# Patient Record
Sex: FEMALE | Race: BLACK OR AFRICAN AMERICAN | NOT HISPANIC OR LATINO | Employment: OTHER | URBAN - METROPOLITAN AREA
[De-identification: names, ages, dates, MRNs, and addresses within clinical notes are randomized per-mention and may not be internally consistent; named-entity substitution may affect disease eponyms.]

---

## 2023-03-13 LAB
ANION GAP IN SER/PLAS: 10 MMOL/L (ref 10–20)
CALCIUM (MG/DL) IN SER/PLAS: 9.5 MG/DL (ref 8.6–10.6)
CARBON DIOXIDE, TOTAL (MMOL/L) IN SER/PLAS: 33 MMOL/L (ref 21–32)
CHLORIDE (MMOL/L) IN SER/PLAS: 102 MMOL/L (ref 98–107)
CREATININE (MG/DL) IN SER/PLAS: 0.73 MG/DL (ref 0.5–1.05)
ESTIMATED AVERAGE GLUCOSE FOR HBA1C: 174 MG/DL
GFR FEMALE: 88 ML/MIN/1.73M2
GLUCOSE (MG/DL) IN SER/PLAS: 160 MG/DL (ref 74–99)
HEMOGLOBIN A1C/HEMOGLOBIN TOTAL IN BLOOD: 7.7 %
POTASSIUM (MMOL/L) IN SER/PLAS: 4.6 MMOL/L (ref 3.5–5.3)
SODIUM (MMOL/L) IN SER/PLAS: 140 MMOL/L (ref 136–145)
UREA NITROGEN (MG/DL) IN SER/PLAS: 8 MG/DL (ref 6–23)

## 2023-06-13 LAB
ALANINE AMINOTRANSFERASE (SGPT) (U/L) IN SER/PLAS: 7 U/L (ref 7–45)
ALBUMIN (G/DL) IN SER/PLAS: 3.9 G/DL (ref 3.4–5)
ALKALINE PHOSPHATASE (U/L) IN SER/PLAS: 60 U/L (ref 33–136)
ANION GAP IN SER/PLAS: 11 MMOL/L (ref 10–20)
ASPARTATE AMINOTRANSFERASE (SGOT) (U/L) IN SER/PLAS: 11 U/L (ref 9–39)
BASOPHILS (10*3/UL) IN BLOOD BY AUTOMATED COUNT: 0.05 X10E9/L (ref 0–0.1)
BASOPHILS/100 LEUKOCYTES IN BLOOD BY AUTOMATED COUNT: 0.9 % (ref 0–2)
BILIRUBIN DIRECT (MG/DL) IN SER/PLAS: 0.1 MG/DL (ref 0–0.3)
BILIRUBIN TOTAL (MG/DL) IN SER/PLAS: 0.5 MG/DL (ref 0–1.2)
CALCIUM (MG/DL) IN SER/PLAS: 9.6 MG/DL (ref 8.6–10.6)
CARBON DIOXIDE, TOTAL (MMOL/L) IN SER/PLAS: 31 MMOL/L (ref 21–32)
CHLORIDE (MMOL/L) IN SER/PLAS: 103 MMOL/L (ref 98–107)
CHOLESTEROL (MG/DL) IN SER/PLAS: 258 MG/DL (ref 0–199)
CHOLESTEROL IN HDL (MG/DL) IN SER/PLAS: 43.8 MG/DL
CHOLESTEROL/HDL RATIO: 5.9
CREATININE (MG/DL) IN SER/PLAS: 0.8 MG/DL (ref 0.5–1.05)
EOSINOPHILS (10*3/UL) IN BLOOD BY AUTOMATED COUNT: 0.09 X10E9/L (ref 0–0.7)
EOSINOPHILS/100 LEUKOCYTES IN BLOOD BY AUTOMATED COUNT: 1.6 % (ref 0–6)
ERYTHROCYTE DISTRIBUTION WIDTH (RATIO) BY AUTOMATED COUNT: 12.6 % (ref 11.5–14.5)
ERYTHROCYTE MEAN CORPUSCULAR HEMOGLOBIN CONCENTRATION (G/DL) BY AUTOMATED: 32.3 G/DL (ref 32–36)
ERYTHROCYTE MEAN CORPUSCULAR VOLUME (FL) BY AUTOMATED COUNT: 105 FL (ref 80–100)
ERYTHROCYTES (10*6/UL) IN BLOOD BY AUTOMATED COUNT: 4.46 X10E12/L (ref 4–5.2)
ESTIMATED AVERAGE GLUCOSE FOR HBA1C: 169 MG/DL
GFR FEMALE: 79 ML/MIN/1.73M2
GLUCOSE (MG/DL) IN SER/PLAS: 144 MG/DL (ref 74–99)
HEMATOCRIT (%) IN BLOOD BY AUTOMATED COUNT: 46.7 % (ref 36–46)
HEMOGLOBIN (G/DL) IN BLOOD: 15.1 G/DL (ref 12–16)
HEMOGLOBIN A1C/HEMOGLOBIN TOTAL IN BLOOD: 7.5 %
IMMATURE GRANULOCYTES/100 LEUKOCYTES IN BLOOD BY AUTOMATED COUNT: 0.5 % (ref 0–0.9)
LDL: 180 MG/DL (ref 0–99)
LEUKOCYTES (10*3/UL) IN BLOOD BY AUTOMATED COUNT: 5.6 X10E9/L (ref 4.4–11.3)
LYMPHOCYTES (10*3/UL) IN BLOOD BY AUTOMATED COUNT: 3.51 X10E9/L (ref 1.2–4.8)
LYMPHOCYTES/100 LEUKOCYTES IN BLOOD BY AUTOMATED COUNT: 63.1 % (ref 13–44)
MONOCYTES (10*3/UL) IN BLOOD BY AUTOMATED COUNT: 0.45 X10E9/L (ref 0.1–1)
MONOCYTES/100 LEUKOCYTES IN BLOOD BY AUTOMATED COUNT: 8.1 % (ref 2–10)
NEUTROPHILS (10*3/UL) IN BLOOD BY AUTOMATED COUNT: 1.43 X10E9/L (ref 1.2–7.7)
NEUTROPHILS/100 LEUKOCYTES IN BLOOD BY AUTOMATED COUNT: 25.8 % (ref 40–80)
NRBC (PER 100 WBCS) BY AUTOMATED COUNT: 0 /100 WBC (ref 0–0)
PLATELETS (10*3/UL) IN BLOOD AUTOMATED COUNT: 250 X10E9/L (ref 150–450)
POTASSIUM (MMOL/L) IN SER/PLAS: 4 MMOL/L (ref 3.5–5.3)
PROTEIN TOTAL: 7.2 G/DL (ref 6.4–8.2)
SODIUM (MMOL/L) IN SER/PLAS: 141 MMOL/L (ref 136–145)
TRIGLYCERIDE (MG/DL) IN SER/PLAS: 171 MG/DL (ref 0–149)
UREA NITROGEN (MG/DL) IN SER/PLAS: 12 MG/DL (ref 6–23)
VLDL: 34 MG/DL (ref 0–40)

## 2023-09-18 LAB
ANION GAP IN SER/PLAS: 10 MMOL/L (ref 10–20)
CALCIUM (MG/DL) IN SER/PLAS: 9.4 MG/DL (ref 8.6–10.6)
CARBON DIOXIDE, TOTAL (MMOL/L) IN SER/PLAS: 31 MMOL/L (ref 21–32)
CHLORIDE (MMOL/L) IN SER/PLAS: 104 MMOL/L (ref 98–107)
CREATININE (MG/DL) IN SER/PLAS: 0.83 MG/DL (ref 0.5–1.05)
ESTIMATED AVERAGE GLUCOSE FOR HBA1C: 157 MG/DL
GFR FEMALE: 75 ML/MIN/1.73M2
GLUCOSE (MG/DL) IN SER/PLAS: 127 MG/DL (ref 74–99)
HEMOGLOBIN A1C/HEMOGLOBIN TOTAL IN BLOOD: 7.1 %
POTASSIUM (MMOL/L) IN SER/PLAS: 4.4 MMOL/L (ref 3.5–5.3)
SODIUM (MMOL/L) IN SER/PLAS: 141 MMOL/L (ref 136–145)
UREA NITROGEN (MG/DL) IN SER/PLAS: 10 MG/DL (ref 6–23)

## 2023-10-27 PROBLEM — R09.89 BRUIT: Status: ACTIVE | Noted: 2023-10-27

## 2023-10-27 PROBLEM — E11.9 DIABETES MELLITUS (MULTI): Status: ACTIVE | Noted: 2023-10-27

## 2023-10-27 PROBLEM — R01.1 CARDIAC MURMUR: Status: ACTIVE | Noted: 2023-10-27

## 2023-10-27 PROBLEM — E78.5 HYPERLIPIDEMIA: Status: ACTIVE | Noted: 2023-10-27

## 2023-10-27 PROBLEM — S92.514A CLOSED NONDISPLACED FRACTURE OF PROXIMAL PHALANX OF LESSER TOE OF RIGHT FOOT: Status: ACTIVE | Noted: 2023-10-27

## 2023-10-27 PROBLEM — I73.9 PERIPHERAL ARTERIAL DISEASE (CMS-HCC): Status: ACTIVE | Noted: 2023-10-27

## 2023-10-27 PROBLEM — R94.39 ABNORMAL STRESS TEST: Status: ACTIVE | Noted: 2023-10-27

## 2023-10-27 PROBLEM — R92.8 ABNORMAL FINDING ON BREAST IMAGING: Status: ACTIVE | Noted: 2023-10-27

## 2023-10-27 PROBLEM — I70.261 ATHEROSCLEROSIS OF NATIVE ARTERY OF RIGHT LOWER EXTREMITY WITH GANGRENE (MULTI): Status: ACTIVE | Noted: 2023-10-27

## 2023-10-27 PROBLEM — T81.89XA SURGICAL WOUND, NON HEALING: Status: ACTIVE | Noted: 2023-10-27

## 2023-10-27 PROBLEM — I65.23 ASYMPTOMATIC BILATERAL CAROTID ARTERY STENOSIS: Status: ACTIVE | Noted: 2023-10-27

## 2023-10-27 RX ORDER — CLOPIDOGREL BISULFATE 75 MG/1
1 TABLET ORAL DAILY
COMMUNITY
Start: 2020-04-13

## 2023-10-27 RX ORDER — POTASSIUM CHLORIDE 750 MG/1
1 TABLET, FILM COATED, EXTENDED RELEASE ORAL DAILY
COMMUNITY
Start: 2019-08-05

## 2023-10-27 RX ORDER — METOPROLOL TARTRATE 25 MG/1
1 TABLET, FILM COATED ORAL DAILY
COMMUNITY
End: 2023-10-30 | Stop reason: CLARIF

## 2023-10-27 RX ORDER — ASPIRIN 81 MG/1
1 TABLET ORAL DAILY
COMMUNITY

## 2023-10-27 RX ORDER — ACETAMINOPHEN 500 MG
TABLET ORAL AS NEEDED
COMMUNITY

## 2023-10-27 RX ORDER — METFORMIN HYDROCHLORIDE 1000 MG/1
1 TABLET ORAL EVERY 12 HOURS
COMMUNITY

## 2023-10-27 RX ORDER — ATORVASTATIN CALCIUM 80 MG/1
1 TABLET, FILM COATED ORAL DAILY
COMMUNITY

## 2023-10-27 RX ORDER — LISINOPRIL 10 MG/1
1 TABLET ORAL DAILY
COMMUNITY
Start: 2019-08-05 | End: 2023-10-30 | Stop reason: DRUGHIGH

## 2023-10-27 RX ORDER — GABAPENTIN 300 MG/1
300 CAPSULE ORAL 3 TIMES DAILY
COMMUNITY

## 2023-10-30 ENCOUNTER — HOSPITAL ENCOUNTER (OUTPATIENT)
Dept: VASCULAR MEDICINE | Facility: HOSPITAL | Age: 73
Discharge: HOME | End: 2023-10-30
Payer: MEDICARE

## 2023-10-30 ENCOUNTER — OFFICE VISIT (OUTPATIENT)
Dept: VASCULAR SURGERY | Facility: HOSPITAL | Age: 73
End: 2023-10-30
Payer: MEDICARE

## 2023-10-30 VITALS
HEIGHT: 61 IN | OXYGEN SATURATION: 93 % | HEART RATE: 86 BPM | WEIGHT: 151 LBS | RESPIRATION RATE: 17 BRPM | BODY MASS INDEX: 28.51 KG/M2 | DIASTOLIC BLOOD PRESSURE: 67 MMHG | SYSTOLIC BLOOD PRESSURE: 114 MMHG

## 2023-10-30 DIAGNOSIS — I73.9 PERIPHERAL VASCULAR DISEASE, UNSPECIFIED (CMS-HCC): ICD-10-CM

## 2023-10-30 DIAGNOSIS — I73.9 PAD (PERIPHERAL ARTERY DISEASE) (CMS-HCC): Primary | ICD-10-CM

## 2023-10-30 DIAGNOSIS — I65.23 BILATERAL CAROTID ARTERY STENOSIS WITHOUT CEREBRAL INFARCTION: ICD-10-CM

## 2023-10-30 PROCEDURE — 3078F DIAST BP <80 MM HG: CPT | Performed by: CLINICAL NURSE SPECIALIST

## 2023-10-30 PROCEDURE — 1159F MED LIST DOCD IN RCRD: CPT | Performed by: CLINICAL NURSE SPECIALIST

## 2023-10-30 PROCEDURE — 93922 UPR/L XTREMITY ART 2 LEVELS: CPT | Performed by: STUDENT IN AN ORGANIZED HEALTH CARE EDUCATION/TRAINING PROGRAM

## 2023-10-30 PROCEDURE — 93922 UPR/L XTREMITY ART 2 LEVELS: CPT

## 2023-10-30 PROCEDURE — 99214 OFFICE O/P EST MOD 30 MIN: CPT | Performed by: CLINICAL NURSE SPECIALIST

## 2023-10-30 PROCEDURE — 1126F AMNT PAIN NOTED NONE PRSNT: CPT | Performed by: CLINICAL NURSE SPECIALIST

## 2023-10-30 PROCEDURE — 3051F HG A1C>EQUAL 7.0%<8.0%: CPT | Performed by: CLINICAL NURSE SPECIALIST

## 2023-10-30 PROCEDURE — 4004F PT TOBACCO SCREEN RCVD TLK: CPT | Performed by: CLINICAL NURSE SPECIALIST

## 2023-10-30 PROCEDURE — 4010F ACE/ARB THERAPY RXD/TAKEN: CPT | Performed by: CLINICAL NURSE SPECIALIST

## 2023-10-30 PROCEDURE — 3074F SYST BP LT 130 MM HG: CPT | Performed by: CLINICAL NURSE SPECIALIST

## 2023-10-30 RX ORDER — METOPROLOL SUCCINATE 50 MG/1
50 TABLET, EXTENDED RELEASE ORAL DAILY
COMMUNITY
Start: 2023-06-01

## 2023-10-30 RX ORDER — EZETIMIBE 10 MG/1
10 TABLET ORAL DAILY
COMMUNITY
Start: 2023-06-15

## 2023-10-30 RX ORDER — FUROSEMIDE 20 MG/1
20 TABLET ORAL DAILY PRN
COMMUNITY
Start: 2022-12-16

## 2023-10-30 RX ORDER — GLIMEPIRIDE 1 MG/1
1 TABLET ORAL
COMMUNITY
Start: 2023-06-15

## 2023-10-30 RX ORDER — LISINOPRIL 20 MG/1
20 TABLET ORAL DAILY
COMMUNITY
Start: 2023-06-15

## 2023-10-30 ASSESSMENT — ENCOUNTER SYMPTOMS
DEPRESSION: 0
OCCASIONAL FEELINGS OF UNSTEADINESS: 0
LOSS OF SENSATION IN FEET: 0

## 2023-10-30 ASSESSMENT — VISUAL ACUITY: OU: 1

## 2023-10-30 NOTE — PROGRESS NOTES
Vascular Surgery Consultation, History, Physical    Kalpana Young is a 73 y.o. year old female patient here for follow-up status post right leg revascularization for gangrenous right second toe .  Patient has been doing very well.  Patient denies any chest pain, shortness of breath, fever or chills.  Patient denies any leg pain with walking or at rest.  Patient denies any recent wounds that are nonhealing.  Patient has no left leg complaints as well.  History:   2020  OPERATION/PROCEDURE:   1. Right common and deep femoral endarterectomy with vein patch   angioplasty.  2. Right superficial femoral artery endarterectomy with vein   patch angioplasty.  3. Angioplasty and stent placement in right femoral popliteal   artery using drug-eluting stent and drug-coated balloon.  4. Central line placement for emergency access during the   procedure.     Procedure for rest pain and gangrene   Has known R SFA restenosis, asymptomatic   No rest pain No claudication   Denies amurosis fugax, TIA or stroke   No past medical history on file.    Past Surgical History:   Procedure Laterality Date    OTHER SURGICAL HISTORY  07/10/2019    Cholecystectomy    OTHER SURGICAL HISTORY  07/10/2019     section    OTHER SURGICAL HISTORY  07/10/2019    Tonsillectomy       Active Ambulatory Problems     Diagnosis Date Noted    Abnormal finding on breast imaging 10/27/2023    Abnormal stress test 10/27/2023    Asymptomatic bilateral carotid artery stenosis 10/27/2023    Atherosclerosis of native artery of right lower extremity with gangrene (CMS/[facility]) 10/27/2023    Bruit 10/27/2023    Cardiac murmur 10/27/2023    Closed nondisplaced fracture of proximal phalanx of lesser toe of right foot 10/27/2023    Diabetes mellitus (CMS/HCC) 10/27/2023    Hyperlipidemia 10/27/2023    Peripheral arterial disease (CMS/HCC) 10/27/2023    Surgical wound, non healing 10/27/2023     Resolved Ambulatory Problems     Diagnosis Date Noted    No Resolved  Ambulatory Problems     No Additional Past Medical History       Review of Systems   All other systems reviewed and are negative.      Allergies   Allergen Reactions    Codeine Rash    Penicillins Rash       Vitals:   Visit Vitals  /67 (BP Location: Left arm, Patient Position: Sitting, BP Cuff Size: Large adult)   Pulse 86   Resp 17     Physical Exam:   Vascular Physical Exam  Vitals and nursing note reviewed.   Constitutional:       General: She is awake.   Eyes:      General: Vision grossly intact.   Cardiovascular:      Rate and Rhythm: Normal rate and regular rhythm.      Pulses:           Carotid pulses are 2+ on the right side and 2+ on the left side.       Radial pulses are 2+ on the right side and 2+ on the left side.        Femoral pulses are 2+ on the right side and 2+ on the left side.       Popliteal pulses are 2+ on the right side and 2+ on the left side.        Dorsalis pedis pulses are detected w/ Doppler on the right side and 2+ on the left side.  Right dorsalis pedis pulse signal is biphasic.         Posterior tibial pulses are detected w/ Doppler on the right side and 2+ on the left side. Right posterior tibial pulse signal is monophasic.   Abdominal:      General: Abdomen is flat. Bowel sounds are normal.   Musculoskeletal:      Neck: Full passive range of motion without pain.   Skin:     General: Skin is warm and dry.   Neurological:      Mental Status: She is alert.         Labs:  LABS:  CBC with Differential:    Lab Results   Component Value Date    WBC 5.6 06/13/2023    RBC 4.46 06/13/2023    HGB 15.1 06/13/2023    HCT 46.7 (H) 06/13/2023     06/13/2023     (H) 06/13/2023    MCHC 32.3 06/13/2023    RDW 12.6 06/13/2023    NRBC 0.0 06/13/2023    LYMPHOPCT 63.1 06/13/2023    MONOPCT 8.1 06/13/2023    EOSPCT 1.6 06/13/2023    BASOPCT 0.9 06/13/2023    MONOSABS 0.45 06/13/2023    LYMPHSABS 3.51 06/13/2023    EOSABS 0.09 06/13/2023    BASOSABS 0.05 06/13/2023     CMP:    Lab  GENERAL:  Alert, oriented, pleasant, in no apparent distress. HEENT:  Conjunctiva pink, no scleral icterus. ENT clear. NECK:  Supple. No jugular venous distention noted. No masses felt,  CARDIOVASCULAR:  Normal S1 and S2    PULMONARY:  No respiratory distress. No wheezes or rales. ABDOMEN:  Soft and non-tender,no masses  or organomegaly. EXTREMITIES:  No cyanosis, clubbing, or significant edema. SKIN: Skin is warm and dry. NEUROLOGICAL:  Cranial nerves II through XII are grossly intact. IMPRESSION:    Encounter Diagnoses   Name Primary?  Cough Yes    Type 2 diabetes mellitus without complication, without long-term current use of insulin ([facility])     Essential hypertension     Mixed hyperlipidemia     Chronic midline low back pain without sciatica     Restless leg syndrome     Cyst, kidney, acquired     Class 2 obesity due to excess calories without serious comorbidity with body mass index (BMI) of 39.0 to 39.9 in adult     Dupuytren's contracture of right hand     CAD s/p MICLIF in 1998        ASSESSMENT/Plan  1. Cough  Patient has cough for the last 2 months. Seems to be due to ACE inhibitor this and apparently  We will discontinue lisinopril  Get a chest x-ray   Start patient on Diovan 160 mg daily  Side effects explained to the patient  - XR Chest PA and Lateral; Future  - CBC with Differential; Future    2. Type 2 diabetes mellitus without complication, without long-term current use of insulin ([facility]  Patient has diabetes mellitus. Hemoglobin A1c has decreased to 5.9. She had no hypoglycemic episodes. Continue glipizide 2.5 mg metformin 500 mg 2 tablets twice a day follow diet and exercise  - POCT glycosylated hemoglobin (Hb A1C)    3. Essential hypertension  Patient has hypertension  Patient was taking lisinopril that will be discontinued because of cough. Start patient on Diovan 160 mg daily.   Continue metoprolol 25 mg twice a day    - Comprehensive Metabolic Panel; "Results   Component Value Date     09/18/2023    K 4.4 09/18/2023     09/18/2023    CO2 31 09/18/2023    BUN 10 09/18/2023    CREATININE 0.83 09/18/2023    GLUCOSE 127 (H) 09/18/2023    PROT 7.2 06/13/2023    CALCIUM 9.4 09/18/2023    BILITOT 0.5 06/13/2023    ALKPHOS 60 06/13/2023    AST 11 06/13/2023    ALT 7 06/13/2023     BMP:    Lab Results   Component Value Date     09/18/2023    K 4.4 09/18/2023     09/18/2023    CO2 31 09/18/2023    BUN 10 09/18/2023    CREATININE 0.83 09/18/2023    CALCIUM 9.4 09/18/2023    GLUCOSE 127 (H) 09/18/2023     Magnesium:No results found for: \"MG\"  Troponin:  No results found for: \"TROPHS\"  BNP: No results found for: \"BNP\"    Lab Results   Component Value Date    INR 1.2 (H) 03/31/2020    PROTIME 13.4 (H) 03/31/2020    CHOL 258 (H) 06/13/2023    LDLF 180 (H) 06/13/2023    HDL 43.8 06/13/2023       Imaging: PVR reviewed     Impression: stable    Plan:   It was a pleasure to see you today.  Status post right leg revascularization in 2021.  No signs and symptoms of claudication or rest pain.  Pulses present with Doppler on the right leg.  Follow-up in 1 year with repeat carotid duplex and PVR  Follow-up sooner if needed  Please call [phone] for any questions.  " Future    4. Mixed hyperlipidemia  Patient has hyperlipidemia. Follow low-cholesterol diet and continue Crestor 10 mg daily. Will check lipid profile  - Lipid Panel w/ Reflex Direct LDL; Future    5. Chronic midline low back pain without sciatica  Patient had chronic lower back pain that is stable. 6. Restless leg syndrome  Patient has restless leg syndrome that also stable. Patient was taking coenzyme Q10. And vitamin D3     7. Cyst, kidney, acquired  Patient has cyst in the kidney and was seeing urologist Dr. Portia Flynn in the past but she did not keep the follow-up appointments. 8. Class 2 obesity due to excess calories without serious comorbidity with body mass index (BMI) of 39.0 to 39.9 in adult  Patient to lose weight and do exercise     9. Dupuytren's contracture of right hand  Patient has a Dupuytren's contracture of the right hand stable       10. CAD s/p MI, TPA in 1998  Patient's coronary artery disease no angina. No chest pain  Patient is on aspirin, beta blocker and statins    Return to office in 3 months. If the cough does not improve patient should return in 1 month     Mediations reviewed with the patient. Continue current medications. Appropriate prescriptions are addressed. After visit summery provided. Follow up as directed sooner if needed. Questions answered and patient verbalizes understanding. Call for any problems, questions, or concerns. No Known Allergies  Current Outpatient Prescriptions   Medication Sig Dispense Refill    rosuvastatin (CRESTOR) 10 MG tablet Take 1 tablet by mouth nightly 90 tablet 1    econazole nitrate 1 % cream Apply 1 each topically 2 times daily Apply topically daily.  60 g 0    metoprolol tartrate (LOPRESSOR) 50 MG tablet Take 0.5 tablets by mouth 2 times daily 90 tablet 1    lisinopril (PRINIVIL;ZESTRIL) 20 MG tablet Take 1 tablet by mouth daily 90 tablet 1    glipiZIDE-metformin (METAGLIP) 2.5-500 MG per tablet Take 2 tablets by mouth 2 times daily (before meals) 360 tablet 3    aspirin 81 MG EC tablet Take 1 tablet by mouth nightly. 30 tablet     Cholecalciferol (VITAMIN D PO) Take 5,000 Units by mouth 2 times daily       Coenzyme Q10 (CO Q 10 PO) Take 200 mg by mouth daily.  Misc. Devices (RAISED TOILET SEAT) MISC Lifetime need. Low back pain R hip pain 1 each 0    naproxen (NAPROSYN) 500 MG tablet Take 1 tablet by mouth 2 times daily as needed for Pain 20 tablet 0    HYDROcodone-acetaminophen (NORCO) 5-325 MG per tablet Take 1-2 tablets by mouth every 4 hours as needed for Pain . 20 tablet 0     No current facility-administered medications for this visit. Past Medical History:   Diagnosis Date    Arthritis     left knee pain, sees Dr. Della Aleman CAD (coronary artery disease)     sees Dr. Andrea Edwards Chronic midline low back pain without sciatica 9/28/2016    Had PT in 2016   [address] Colonoscopy refused     discussed in 7/15    DM (diabetes mellitus), type 2 ([facility] 02/2006    Dupuytren's contracture of right hand     Endometrial stripe increased 9/25/2014    Saw NP Maynor Zaragoza. Was normal exam per pt.  Glaucoma 4/1/2014    H/O echocardiogram 10/02/2014    Mildly depressed left ventricular function; ejection fraction of 45%. Moderate pulmonary hypertension.      HTN (hypertension)     Hyperlipidemia     Kidney stone     hx-\"been about 3 yrs ago\"    MI (myocardial infarction) 02/1998    treated with TPA    Obesity     Vertigo     'have been having this since I had cataract surgery\"\"that is why they are doing the MRA of my brain(10/31/2014)     Past Surgical History:   Procedure Laterality Date    CATARACT REMOVAL Bilateral     5/14,8/14    INGUINAL HERNIA REPAIR Left 45 yrs ago     Social History   Substance Use Topics    Smoking status: Never Smoker    Smokeless tobacco: Never Used    Alcohol use No       LAB REVIEW:  CBC:   Lab Results   Component Value Date    WBC 7.5 08/18/2017    HGB 12.8 08/18/2017

## 2023-10-30 NOTE — PATIENT INSTRUCTIONS
It was a pleasure to see you today.  Status post right leg revascularization in 2021.  No signs and symptoms of claudication or rest pain.  Pulses present with Doppler on the right leg.  Follow-up in 1 year with repeat carotid duplex and PVR  Follow-up sooner if needed  Please call [phone] for any questions.

## 2023-12-11 ENCOUNTER — LAB (OUTPATIENT)
Dept: LAB | Facility: LAB | Age: 73
End: 2023-12-11
Payer: MEDICARE

## 2023-12-11 DIAGNOSIS — E11.9 TYPE 2 DIABETES MELLITUS WITHOUT COMPLICATIONS (MULTI): Primary | ICD-10-CM

## 2023-12-11 LAB
ANION GAP SERPL CALC-SCNC: 13 MMOL/L (ref 10–20)
BASOPHILS # BLD AUTO: 0.03 X10*3/UL (ref 0–0.1)
BASOPHILS NFR BLD AUTO: 0.7 %
BUN SERPL-MCNC: 8 MG/DL (ref 6–23)
CALCIUM SERPL-MCNC: 9.3 MG/DL (ref 8.6–10.6)
CHLORIDE SERPL-SCNC: 105 MMOL/L (ref 98–107)
CO2 SERPL-SCNC: 30 MMOL/L (ref 21–32)
CREAT SERPL-MCNC: 0.84 MG/DL (ref 0.5–1.05)
EOSINOPHIL # BLD AUTO: 0.05 X10*3/UL (ref 0–0.4)
EOSINOPHIL NFR BLD AUTO: 1.1 %
ERYTHROCYTE [DISTWIDTH] IN BLOOD BY AUTOMATED COUNT: 12.8 % (ref 11.5–14.5)
EST. AVERAGE GLUCOSE BLD GHB EST-MCNC: 157 MG/DL
GFR SERPL CREATININE-BSD FRML MDRD: 74 ML/MIN/1.73M*2
GLUCOSE SERPL-MCNC: 163 MG/DL (ref 74–99)
HBA1C MFR BLD: 7.1 %
HCT VFR BLD AUTO: 44.9 % (ref 36–46)
HGB BLD-MCNC: 14.6 G/DL (ref 12–16)
IMM GRANULOCYTES # BLD AUTO: 0.02 X10*3/UL (ref 0–0.5)
IMM GRANULOCYTES NFR BLD AUTO: 0.4 % (ref 0–0.9)
LYMPHOCYTES # BLD AUTO: 2.32 X10*3/UL (ref 0.8–3)
LYMPHOCYTES NFR BLD AUTO: 51.4 %
MCH RBC QN AUTO: 34 PG (ref 26–34)
MCHC RBC AUTO-ENTMCNC: 32.5 G/DL (ref 32–36)
MCV RBC AUTO: 104 FL (ref 80–100)
MONOCYTES # BLD AUTO: 0.31 X10*3/UL (ref 0.05–0.8)
MONOCYTES NFR BLD AUTO: 6.9 %
NEUTROPHILS # BLD AUTO: 1.78 X10*3/UL (ref 1.6–5.5)
NEUTROPHILS NFR BLD AUTO: 39.5 %
NRBC BLD-RTO: 0 /100 WBCS (ref 0–0)
PLATELET # BLD AUTO: 331 X10*3/UL (ref 150–450)
POTASSIUM SERPL-SCNC: 4.8 MMOL/L (ref 3.5–5.3)
RBC # BLD AUTO: 4.3 X10*6/UL (ref 4–5.2)
SODIUM SERPL-SCNC: 143 MMOL/L (ref 136–145)
WBC # BLD AUTO: 4.5 X10*3/UL (ref 4.4–11.3)

## 2023-12-11 PROCEDURE — 80048 BASIC METABOLIC PNL TOTAL CA: CPT

## 2023-12-11 PROCEDURE — 83036 HEMOGLOBIN GLYCOSYLATED A1C: CPT

## 2023-12-11 PROCEDURE — 36415 COLL VENOUS BLD VENIPUNCTURE: CPT

## 2023-12-11 PROCEDURE — 85025 COMPLETE CBC W/AUTO DIFF WBC: CPT

## 2024-02-26 ENCOUNTER — LAB (OUTPATIENT)
Dept: LAB | Facility: LAB | Age: 74
End: 2024-02-26
Payer: MEDICARE

## 2024-02-26 DIAGNOSIS — E11.9 TYPE 2 DIABETES MELLITUS WITHOUT COMPLICATIONS (MULTI): ICD-10-CM

## 2024-02-26 DIAGNOSIS — I10 ESSENTIAL (PRIMARY) HYPERTENSION: Primary | ICD-10-CM

## 2024-02-26 LAB
ALBUMIN SERPL BCP-MCNC: 3.9 G/DL (ref 3.4–5)
ALP SERPL-CCNC: 78 U/L (ref 33–136)
ALT SERPL W P-5'-P-CCNC: 9 U/L (ref 7–45)
ANION GAP SERPL CALC-SCNC: 12 MMOL/L (ref 10–20)
AST SERPL W P-5'-P-CCNC: 10 U/L (ref 9–39)
BASOPHILS # BLD AUTO: 0.05 X10*3/UL (ref 0–0.1)
BASOPHILS NFR BLD AUTO: 0.8 %
BILIRUB DIRECT SERPL-MCNC: 0.1 MG/DL (ref 0–0.3)
BILIRUB SERPL-MCNC: 0.4 MG/DL (ref 0–1.2)
BUN SERPL-MCNC: 11 MG/DL (ref 6–23)
CALCIUM SERPL-MCNC: 9.6 MG/DL (ref 8.6–10.6)
CHLORIDE SERPL-SCNC: 104 MMOL/L (ref 98–107)
CHOLEST SERPL-MCNC: 246 MG/DL (ref 0–199)
CHOLESTEROL/HDL RATIO: 5.7
CO2 SERPL-SCNC: 29 MMOL/L (ref 21–32)
CREAT SERPL-MCNC: 0.84 MG/DL (ref 0.5–1.05)
EGFRCR SERPLBLD CKD-EPI 2021: 74 ML/MIN/1.73M*2
EOSINOPHIL # BLD AUTO: 0.07 X10*3/UL (ref 0–0.4)
EOSINOPHIL NFR BLD AUTO: 1.2 %
ERYTHROCYTE [DISTWIDTH] IN BLOOD BY AUTOMATED COUNT: 12.9 % (ref 11.5–14.5)
EST. AVERAGE GLUCOSE BLD GHB EST-MCNC: 163 MG/DL
GLUCOSE SERPL-MCNC: 195 MG/DL (ref 74–99)
HBA1C MFR BLD: 7.3 %
HCT VFR BLD AUTO: 46.1 % (ref 36–46)
HDLC SERPL-MCNC: 43.1 MG/DL
HGB BLD-MCNC: 14.9 G/DL (ref 12–16)
IMM GRANULOCYTES # BLD AUTO: 0.03 X10*3/UL (ref 0–0.5)
IMM GRANULOCYTES NFR BLD AUTO: 0.5 % (ref 0–0.9)
LDLC SERPL CALC-MCNC: 142 MG/DL
LYMPHOCYTES # BLD AUTO: 3.19 X10*3/UL (ref 0.8–3)
LYMPHOCYTES NFR BLD AUTO: 54.2 %
MCH RBC QN AUTO: 34.2 PG (ref 26–34)
MCHC RBC AUTO-ENTMCNC: 32.3 G/DL (ref 32–36)
MCV RBC AUTO: 106 FL (ref 80–100)
MONOCYTES # BLD AUTO: 0.34 X10*3/UL (ref 0.05–0.8)
MONOCYTES NFR BLD AUTO: 5.8 %
NEUTROPHILS # BLD AUTO: 2.21 X10*3/UL (ref 1.6–5.5)
NEUTROPHILS NFR BLD AUTO: 37.5 %
NON HDL CHOLESTEROL: 203 MG/DL (ref 0–149)
NRBC BLD-RTO: 0 /100 WBCS (ref 0–0)
PLATELET # BLD AUTO: 296 X10*3/UL (ref 150–450)
POTASSIUM SERPL-SCNC: 4.3 MMOL/L (ref 3.5–5.3)
PROT SERPL-MCNC: 6.9 G/DL (ref 6.4–8.2)
RBC # BLD AUTO: 4.36 X10*6/UL (ref 4–5.2)
SODIUM SERPL-SCNC: 141 MMOL/L (ref 136–145)
TRIGL SERPL-MCNC: 307 MG/DL (ref 0–149)
TSH SERPL-ACNC: 1.04 MIU/L (ref 0.44–3.98)
VLDL: 61 MG/DL (ref 0–40)
WBC # BLD AUTO: 5.9 X10*3/UL (ref 4.4–11.3)

## 2024-02-26 PROCEDURE — 80061 LIPID PANEL: CPT

## 2024-02-26 PROCEDURE — 36415 COLL VENOUS BLD VENIPUNCTURE: CPT

## 2024-02-26 PROCEDURE — 84443 ASSAY THYROID STIM HORMONE: CPT

## 2024-02-26 PROCEDURE — 83036 HEMOGLOBIN GLYCOSYLATED A1C: CPT

## 2024-02-26 PROCEDURE — 82248 BILIRUBIN DIRECT: CPT

## 2024-02-26 PROCEDURE — 80053 COMPREHEN METABOLIC PANEL: CPT

## 2024-02-26 PROCEDURE — 85025 COMPLETE CBC W/AUTO DIFF WBC: CPT

## 2024-04-16 NOTE — PROGRESS NOTES
[facility]  Kalpana Young female   1950 73 y.o.   [id number]      Chief Complaint  Follow up mammogram.     History Of Present Illness  Kalpana Young is a pleasant 73 y.o. AA female for follow up mammogram. She denies breast surgery or biopsy. She has family history of breast cancer, see below.    BREAST IMAGIN/10/2023 Bilateral screening mammogram, indicates BI-RADS Category 1. 2022 Left diagnostic mammogram with ultrasound, indicates BI-RADS Category 2.     REPRODUCTIVE HISTORY: menarche age 12, , first birth age 31, did not breastfeed, no OCP's, natural menopause age 50, no HRT, scattered fibroglandular tissue    FAMILY CANCER HISTORY:   Maternal Aunt: Breast cancer, age 69  Maternal Cousin: Breast cancer, age 48  Paternal Uncle: Lung cancer, late 60's     Review of Systems  Constitutional:  Negative for appetite change, fatigue, fever and unexpected weight change.   HENT:  Negative for ear pain, hearing loss, nosebleeds, sore throat and trouble swallowing.    Eyes:  Negative for discharge, itching and visual disturbance.   Breast: As stated in HPI.  Respiratory:  Negative for cough, chest tightness and shortness of breath.    Cardiovascular:  Negative for chest pain, palpitations and leg swelling.   Gastrointestinal:  Negative for abdominal pain, constipation, diarrhea and nausea.   Endocrine: Negative for cold intolerance and heat intolerance.   Genitourinary:  Negative for dysuria, frequency, hematuria, pelvic pain and vaginal bleeding.   Musculoskeletal:  Negative for arthralgias, back pain, gait problem, joint swelling and myalgias.   Skin:  Negative for color change and rash.   Allergic/Immunologic: Negative for environmental allergies and food allergies.   Neurological:  Negative for dizziness, tremors, speech difficulty, weakness, numbness and headaches.   Hematological:  Does not bruise/bleed easily.   Psychiatric/Behavioral:  Negative for agitation, dysphoric mood  and sleep disturbance. The patient is not nervous/anxious.      Past Medical History  She has no past medical history on file.    Surgical History  She has a past surgical history that includes Other surgical history (07/10/2019); Other surgical history (07/10/2019); and Other surgical history (07/10/2019).    Family History  Cancer-related family history is not on file.     Social History  She reports that she has been smoking cigarettes. She has never used smokeless tobacco. No history on file for alcohol use and drug use.    Allergies  Codeine and Penicillins    Medications  Current Outpatient Medications   Medication Instructions    acetaminophen (Tylenol) 500 mg tablet As needed    aspirin 81 mg EC tablet 1 tablet, oral, Daily    atorvastatin (Lipitor) 80 mg tablet 1 tablet, oral, Daily    clopidogrel (Plavix) 75 mg tablet 1 tablet, oral, Daily    ezetimibe (ZETIA) 10 mg, oral, Daily    furosemide (LASIX) 20 mg, oral, Daily PRN    gabapentin (NEURONTIN) 300 mg, oral, 3 times daily    glimepiride (AMARYL) 1 mg, oral, Daily before breakfast    lisinopril 20 mg, oral, Daily    metFORMIN (Glucophage) 1,000 mg tablet 1 tablet, oral, Every 12 hours    metoprolol succinate XL (TOPROL-XL) 50 mg, oral, Daily    nystatin (Mycostatin) 100,000 unit/gram powder 1 Application, Topical, 3 times daily    potassium chloride CR (K-Tab) 10 mEq ER tablet 1 tablet, oral, Daily, With food.       Last Recorded Vitals  Vitals:    04/17/24 1241   BP: 95/64   Pulse: 78        Physical Exam  Patient is alert and oriented x3 and in a relaxed and appropriate mood. Her gait is steady and hand grasps are equal. Sclera is clear. The breasts are nearly symmetrical. The tissue is soft without palpable abnormalities, discrete nodules or masses. The nipples appear normal. Right inframammary red fungal rash. There is no cervical, supraclavicular or axillary lymphadenopathy. Heart rate and rhythm normal, S1 and S2 appreciated. The lungs are clear  to auscultation bilaterally. Abdomen is soft and non-tender.     Relevant Results and Imaging  BI mammo bilateral screening tomosynthesis 07/07/2023    Narrative  Interpreted By:  HAILE SOLIS MD  MRN: [id number]  Patient Name: BIRD CORNEJO    STUDY:  DIGITAL MAMM SCREENING W/ YAEL;  7/7/2023 11:56 am    ACCESSION NUMBER(S):  [id number]    ORDERING CLINICIAN:  Referring clinician.    INDICATION:  Screening.    COMPARISON:  All prior mammograms that are available at the time of interpretation    FINDINGS:  2D and tomosynthesis images were reviewed at 1 mm slice thickness.    There are areas of scattered fibroglandular tissue.   No suspicious  masses or calcifications are identified.    Impression  No mammographic evidence of malignancy.    BI-RADS CATEGORY:    Category: 1 - Negative.  Recommendation:  1 Year Screening.    For any future breast imaging appointments, please call [phone] ([phone]).    Patient letter sent SNORM    Visit Diagnosis  1. Breast cancer screening by mammogram  nystatin (Mycostatin) 100,000 unit/gram powder      2. Abnormal finding on breast imaging        3. Mass of breast, unspecified laterality  Referral to Breast Surgery      4. Fungal rash of trunk  nystatin (Mycostatin) 100,000 unit/gram powder        Assessment/Plan  Normal clinical exam and imaging from 7/2023, no breast surgery or biopsy, family history of breast cancer, scattered fibroglandular tissue     Plan:  Return to your PCP for annual mammograms and exams. Nystatin powder under breasts as directed.     Patient Discussion/Summary  Your clinical examination and imaging are normal. You no longer need to be seen by a breast specialist for an annual physical breast examination. It is important to continue annual screening mammograms and breast exams through your primary care provider. Please return to see me if you have a new breast problem or abnormal mammogram. It has been a pleasure having you as a patient.     You can  see your health information, review clinical summaries from office visits & test results online when you follow your health with MY  Chart, a personal health record. To sign up go to [email or web address]. If you need assistance with signing up or trouble getting into your account call [facility] Patient Line 24/7 at [phone].    My office phone number is [phone] if you need to get in touch with me or have additional questions or concerns. Thank you for choosing [facility] and trusting me as your healthcare provider. I look forward to seeing you again at your next office visit. I am honored to be a provider on your health care team and I remain dedicated to helping you achieve your health goals.    Allyson Ron, SUPRIYA-CNP

## 2024-04-17 ENCOUNTER — APPOINTMENT (OUTPATIENT)
Dept: RADIOLOGY | Facility: CLINIC | Age: 74
End: 2024-04-17
Payer: MEDICARE

## 2024-04-17 ENCOUNTER — OFFICE VISIT (OUTPATIENT)
Dept: SURGICAL ONCOLOGY | Facility: CLINIC | Age: 74
End: 2024-04-17
Payer: MEDICARE

## 2024-04-17 VITALS
HEIGHT: 62 IN | SYSTOLIC BLOOD PRESSURE: 95 MMHG | WEIGHT: 150.79 LBS | DIASTOLIC BLOOD PRESSURE: 64 MMHG | HEART RATE: 78 BPM | BODY MASS INDEX: 27.75 KG/M2

## 2024-04-17 DIAGNOSIS — N63.0 MASS OF BREAST, UNSPECIFIED LATERALITY: ICD-10-CM

## 2024-04-17 DIAGNOSIS — Z12.31 BREAST CANCER SCREENING BY MAMMOGRAM: Primary | ICD-10-CM

## 2024-04-17 DIAGNOSIS — B36.9 FUNGAL RASH OF TRUNK: ICD-10-CM

## 2024-04-17 DIAGNOSIS — R92.8 ABNORMAL FINDING ON BREAST IMAGING: ICD-10-CM

## 2024-04-17 PROCEDURE — 99213 OFFICE O/P EST LOW 20 MIN: CPT | Performed by: NURSE PRACTITIONER

## 2024-04-17 RX ORDER — NYSTATIN 100000 [USP'U]/G
1 POWDER TOPICAL 3 TIMES DAILY
Qty: 30 G | Refills: 0 | Status: SHIPPED | OUTPATIENT
Start: 2024-04-17 | End: 2024-05-17

## 2024-04-17 ASSESSMENT — PAIN SCALES - GENERAL: PAINLEVEL: 0-NO PAIN

## 2024-07-17 ENCOUNTER — HOSPITAL ENCOUNTER (OUTPATIENT)
Dept: RADIOLOGY | Facility: CLINIC | Age: 74
Discharge: HOME | End: 2024-07-17
Payer: MEDICARE

## 2024-07-17 VITALS — HEIGHT: 62 IN | WEIGHT: 150.79 LBS | BODY MASS INDEX: 27.75 KG/M2

## 2024-07-17 DIAGNOSIS — Z12.31 SCREENING MAMMOGRAM FOR BREAST CANCER: ICD-10-CM

## 2024-07-17 PROCEDURE — 77067 SCR MAMMO BI INCL CAD: CPT

## 2024-07-25 ENCOUNTER — LAB (OUTPATIENT)
Dept: LAB | Facility: LAB | Age: 74
End: 2024-07-25
Payer: MEDICARE

## 2024-07-25 DIAGNOSIS — E78.5 HYPERLIPIDEMIA, UNSPECIFIED: ICD-10-CM

## 2024-07-25 DIAGNOSIS — I10 ESSENTIAL (PRIMARY) HYPERTENSION: ICD-10-CM

## 2024-07-25 DIAGNOSIS — E11.9 TYPE 2 DIABETES MELLITUS WITHOUT COMPLICATIONS (MULTI): Primary | ICD-10-CM

## 2024-07-25 PROCEDURE — 83036 HEMOGLOBIN GLYCOSYLATED A1C: CPT

## 2024-07-25 PROCEDURE — 80048 BASIC METABOLIC PNL TOTAL CA: CPT

## 2024-07-25 PROCEDURE — 36415 COLL VENOUS BLD VENIPUNCTURE: CPT

## 2024-07-25 PROCEDURE — 85025 COMPLETE CBC W/AUTO DIFF WBC: CPT

## 2024-07-26 LAB
ANION GAP SERPL CALC-SCNC: 14 MMOL/L (ref 10–20)
BASOPHILS # BLD AUTO: 0.04 X10*3/UL (ref 0–0.1)
BASOPHILS NFR BLD AUTO: 1 %
BUN SERPL-MCNC: 9 MG/DL (ref 6–23)
CALCIUM SERPL-MCNC: 9 MG/DL (ref 8.6–10.6)
CHLORIDE SERPL-SCNC: 106 MMOL/L (ref 98–107)
CO2 SERPL-SCNC: 27 MMOL/L (ref 21–32)
CREAT SERPL-MCNC: 0.8 MG/DL (ref 0.5–1.05)
EGFRCR SERPLBLD CKD-EPI 2021: 79 ML/MIN/1.73M*2
EOSINOPHIL # BLD AUTO: 0.07 X10*3/UL (ref 0–0.4)
EOSINOPHIL NFR BLD AUTO: 1.7 %
ERYTHROCYTE [DISTWIDTH] IN BLOOD BY AUTOMATED COUNT: 12.7 % (ref 11.5–14.5)
EST. AVERAGE GLUCOSE BLD GHB EST-MCNC: 157 MG/DL
GLUCOSE SERPL-MCNC: 135 MG/DL (ref 74–99)
HBA1C MFR BLD: 7.1 %
HCT VFR BLD AUTO: 44.4 % (ref 36–46)
HGB BLD-MCNC: 14.5 G/DL (ref 12–16)
IMM GRANULOCYTES # BLD AUTO: 0.01 X10*3/UL (ref 0–0.5)
IMM GRANULOCYTES NFR BLD AUTO: 0.2 % (ref 0–0.9)
LYMPHOCYTES # BLD AUTO: 2.42 X10*3/UL (ref 0.8–3)
LYMPHOCYTES NFR BLD AUTO: 60.2 %
MCH RBC QN AUTO: 34.1 PG (ref 26–34)
MCHC RBC AUTO-ENTMCNC: 32.7 G/DL (ref 32–36)
MCV RBC AUTO: 105 FL (ref 80–100)
MONOCYTES # BLD AUTO: 0.29 X10*3/UL (ref 0.05–0.8)
MONOCYTES NFR BLD AUTO: 7.2 %
NEUTROPHILS # BLD AUTO: 1.19 X10*3/UL (ref 1.6–5.5)
NEUTROPHILS NFR BLD AUTO: 29.7 %
NRBC BLD-RTO: 0.5 /100 WBCS (ref 0–0)
PLATELET # BLD AUTO: 241 X10*3/UL (ref 150–450)
POTASSIUM SERPL-SCNC: 4.5 MMOL/L (ref 3.5–5.3)
RBC # BLD AUTO: 4.25 X10*6/UL (ref 4–5.2)
SODIUM SERPL-SCNC: 142 MMOL/L (ref 136–145)
WBC # BLD AUTO: 4 X10*3/UL (ref 4.4–11.3)

## 2024-08-05 ENCOUNTER — APPOINTMENT (OUTPATIENT)
Dept: RADIOLOGY | Facility: CLINIC | Age: 74
End: 2024-08-05
Payer: MEDICARE

## 2024-09-19 ENCOUNTER — HOSPITAL ENCOUNTER (OUTPATIENT)
Dept: GASTROENTEROLOGY | Facility: HOSPITAL | Age: 74
Discharge: HOME | End: 2024-09-19
Payer: MEDICARE

## 2024-09-19 VITALS
TEMPERATURE: 97.5 F | HEIGHT: 61 IN | SYSTOLIC BLOOD PRESSURE: 109 MMHG | HEART RATE: 80 BPM | DIASTOLIC BLOOD PRESSURE: 54 MMHG | RESPIRATION RATE: 16 BRPM | WEIGHT: 141.31 LBS | OXYGEN SATURATION: 100 % | BODY MASS INDEX: 26.68 KG/M2

## 2024-09-19 DIAGNOSIS — Z13.9 SCREENING DUE: Primary | ICD-10-CM

## 2024-09-19 LAB — GLUCOSE BLD MANUAL STRIP-MCNC: 126 MG/DL (ref 74–99)

## 2024-09-19 PROCEDURE — 7100000009 HC PHASE TWO TIME - INITIAL BASE CHARGE

## 2024-09-19 PROCEDURE — 2500000004 HC RX 250 GENERAL PHARMACY W/ HCPCS (ALT 636 FOR OP/ED): Performed by: INTERNAL MEDICINE

## 2024-09-19 PROCEDURE — G0121 COLON CA SCRN NOT HI RSK IND: HCPCS | Performed by: INTERNAL MEDICINE

## 2024-09-19 PROCEDURE — G0500 MOD SEDAT ENDO SERVICE >5YRS: HCPCS | Performed by: INTERNAL MEDICINE

## 2024-09-19 PROCEDURE — 82947 ASSAY GLUCOSE BLOOD QUANT: CPT

## 2024-09-19 PROCEDURE — 45378 DIAGNOSTIC COLONOSCOPY: CPT | Performed by: INTERNAL MEDICINE

## 2024-09-19 PROCEDURE — 3700000012 HC SEDATION LEVEL 5+ TIME - INITIAL 15 MINUTES 5/> YEARS

## 2024-09-19 PROCEDURE — 7100000010 HC PHASE TWO TIME - EACH INCREMENTAL 1 MINUTE

## 2024-09-19 RX ORDER — SODIUM CHLORIDE, SODIUM LACTATE, POTASSIUM CHLORIDE, CALCIUM CHLORIDE 600; 310; 30; 20 MG/100ML; MG/100ML; MG/100ML; MG/100ML
20 INJECTION, SOLUTION INTRAVENOUS CONTINUOUS
Status: CANCELLED | OUTPATIENT
Start: 2024-09-19

## 2024-09-19 RX ORDER — MIDAZOLAM HYDROCHLORIDE 1 MG/ML
INJECTION INTRAMUSCULAR; INTRAVENOUS AS NEEDED
Status: COMPLETED | OUTPATIENT
Start: 2024-09-19 | End: 2024-09-19

## 2024-09-19 RX ORDER — ONDANSETRON HYDROCHLORIDE 2 MG/ML
INJECTION, SOLUTION INTRAVENOUS AS NEEDED
Status: COMPLETED | OUTPATIENT
Start: 2024-09-19 | End: 2024-09-19

## 2024-09-19 ASSESSMENT — PAIN SCALES - GENERAL
PAINLEVEL_OUTOF10: 0 - NO PAIN

## 2024-09-19 ASSESSMENT — COLUMBIA-SUICIDE SEVERITY RATING SCALE - C-SSRS
1. IN THE PAST MONTH, HAVE YOU WISHED YOU WERE DEAD OR WISHED YOU COULD GO TO SLEEP AND NOT WAKE UP?: NO
6. HAVE YOU EVER DONE ANYTHING, STARTED TO DO ANYTHING, OR PREPARED TO DO ANYTHING TO END YOUR LIFE?: NO
2. HAVE YOU ACTUALLY HAD ANY THOUGHTS OF KILLING YOURSELF?: NO

## 2024-09-19 ASSESSMENT — PAIN - FUNCTIONAL ASSESSMENT
PAIN_FUNCTIONAL_ASSESSMENT: 0-10

## 2024-09-19 NOTE — DISCHARGE INSTRUCTIONS
Patient Instructions after a Colonoscopy      The anesthetics, sedatives or narcotics which were given to you today will be acting in your body for the next 24 hours, so you might feel a little sleepy or groggy.  This feeling should slowly wear off. Carefully read and follow the instructions.     You received sedation today:  - Do not drive or operate any machinery or power tools of any kind.   - No alcoholic beverages today, not even beer or wine.  - Do not make any important decisions or sign any legal documents.  - No over the counter medications that contain alcohol or that may cause drowsiness.  - Do not make any important decisions or sign any legal documents.  - Make sure you have someone with you for first 24 hours.    While it is common to experience mild to moderate abdominal distention, gas, or belching after your procedure, if any of these symptoms occur following discharge from the GI Lab or within one week of having your procedure, call the [facility] to be advised whether a visit to your nearest Urgent Care or Emergency Department is indicated.  Take this paper with you if you go.     - If you develop an allergic reaction to the medications that were given during your procedure such as difficulty breathing, rash, hives, severe nausea, vomiting or lightheadedness.  - If you experience chest pain, shortness of breath, severe abdominal pain, fevers and chills.  -If you develop signs and symptoms of bleeding such as blood in your spit, if your stools turn black, tarry, or bloody  - If you have not urinated within 8 hours following your procedure.  - If your IV site becomes painful, red, inflamed, or looks infected.    If you received a biopsy/polypectomy/sphincterotomy the following instructions apply below:    __ Do not use Aspirin containing products, non-steroidal medications or anti-coagulants for one week following your procedure. (Examples of these types of medications are: Advil,  Arthrotec, Aleve, Coumadin, Ecotrin, Heparin, Ibuprofen, Indocin, Motrin, Naprosyn, Nuprin, Plavix, Vioxx, and Voltarin, or their generic forms.  This list is not all-inclusive.  Check with your physician or pharmacist before resuming medications.)   __ Eat a soft diet today.  Avoid foods that are poorly digested for the next 24 hours.  These foods would include: nuts, beans, lettuce, red meats, and fried foods. Start with liquids and advance your diet as tolerated, gradually work up to eating solids.   __ Do not have a Barium Study or Enema for one week.    Your physician recommends the additional following instructions:    -You have a contact number available for emergencies. The signs and symptoms of potential delayed complications were discussed with you. You may return to normal activities tomorrow.  -Resume your previous diet.  -Continue your present medications.   -We are waiting for your pathology results.  -Your physician has recommended a repeat colonoscopy (date to be determined after pending pathology results are reviewed) for surveillance based on pathology results.  -The findings and recommendations have been discussed with you.  -The findings and recommendations were discussed with your family.  - Please see Medication Reconciliation Form for new medication/medications prescribed.       If you experience any problems or have any questions following discharge from the GI Lab, please call:    Mello Rocha MD  [phone]    To reach your physician after hours call [phone] and ask for the GI Fellow on call      Nurse Signature                                                                        Date___________________                                                                            Patient/Responsible Party Signature                                        Date___________________

## 2024-09-19 NOTE — H&P
"History Of Present Illness  Kalpana Young is a 74 y.o. female presenting with screening.     Past Medical History  Past Medical History:   Diagnosis Date    Diabetes mellitus (Multi)     Hypertension      Surgical History  Past Surgical History:   Procedure Laterality Date    OTHER SURGICAL HISTORY  07/10/2019    Cholecystectomy    OTHER SURGICAL HISTORY  07/10/2019     section    OTHER SURGICAL HISTORY  07/10/2019    Tonsillectomy     Social History  She reports that she has been smoking cigarettes. She has never used smokeless tobacco. She reports that she does not drink alcohol and does not use drugs.    Family History  Family History   Problem Relation Name Age of Onset    Breast cancer Mother's Sister      Breast cancer Cousin          Allergies  Allergies   Allergen Reactions    Codeine Rash    Penicillins Rash     Review of Systems  Pre-sedation Evaluation:  ASA Classification - ASA 2 - Patient with mild systemic disease with no functional limitations  Mallampati Score - II (hard and soft palate, upper portion of tonsils and uvula visible)    Physical Exam  Vitals reviewed.   Constitutional:       Appearance: Normal appearance.   Cardiovascular:      Rate and Rhythm: Normal rate and regular rhythm.   Pulmonary:      Effort: Pulmonary effort is normal.      Breath sounds: Normal breath sounds.   Neurological:      Mental Status: She is alert.          Last Recorded Vitals  Blood pressure 150/59, pulse 78, temperature 36.1 °C (97 °F), temperature source Temporal, resp. rate 17, height 1.549 m (5' 1\"), weight 64.1 kg (141 lb 5 oz), SpO2 95%.     Assessment/Plan   R/O neoplasm for colonoscopy     PTA/Current Medications:  (Not in a hospital admission)    Current Outpatient Medications   Medication Sig Dispense Refill    acetaminophen (Tylenol) 500 mg tablet if needed.      aspirin 81 mg EC tablet Take 1 tablet (81 mg) by mouth once daily.      atorvastatin (Lipitor) 80 mg tablet Take 1 tablet (80 mg) by " mouth once daily.      clopidogrel (Plavix) 75 mg tablet Take 1 tablet (75 mg) by mouth once daily. Last taken 9/16      glimepiride (Amaryl) 1 mg tablet Take 1 tablet (1 mg) by mouth once daily in the morning. Take before meals.      lisinopril 20 mg tablet Take 1 tablet (20 mg) by mouth once daily.      metFORMIN (Glucophage) 1,000 mg tablet Take 1 tablet (1,000 mg) by mouth every 12 hours.      metoprolol succinate XL (Toprol-XL) 50 mg 24 hr tablet Take 1 tablet (50 mg) by mouth once daily.       No current facility-administered medications for this encounter.     Mello Rocha MD

## 2024-09-20 ASSESSMENT — PAIN SCALES - GENERAL: PAINLEVEL_OUTOF10: 0 - NO PAIN

## 2024-10-29 ENCOUNTER — LAB (OUTPATIENT)
Dept: LAB | Facility: LAB | Age: 74
End: 2024-10-29
Payer: MEDICARE

## 2024-10-29 DIAGNOSIS — E11.9 TYPE 2 DIABETES MELLITUS WITHOUT COMPLICATIONS (MULTI): Primary | ICD-10-CM

## 2024-10-29 PROCEDURE — 80048 BASIC METABOLIC PNL TOTAL CA: CPT

## 2024-10-29 PROCEDURE — 83036 HEMOGLOBIN GLYCOSYLATED A1C: CPT

## 2024-10-29 PROCEDURE — 36415 COLL VENOUS BLD VENIPUNCTURE: CPT

## 2024-10-29 PROCEDURE — 85025 COMPLETE CBC W/AUTO DIFF WBC: CPT

## 2024-10-30 LAB
ANION GAP SERPL CALC-SCNC: 14 MMOL/L (ref 10–20)
BASOPHILS # BLD AUTO: 0.05 X10*3/UL (ref 0–0.1)
BASOPHILS NFR BLD AUTO: 1 %
BUN SERPL-MCNC: 10 MG/DL (ref 6–23)
CALCIUM SERPL-MCNC: 9 MG/DL (ref 8.6–10.6)
CHLORIDE SERPL-SCNC: 103 MMOL/L (ref 98–107)
CO2 SERPL-SCNC: 29 MMOL/L (ref 21–32)
CREAT SERPL-MCNC: 0.77 MG/DL (ref 0.5–1.05)
EGFRCR SERPLBLD CKD-EPI 2021: 82 ML/MIN/1.73M*2
EOSINOPHIL # BLD AUTO: 0.05 X10*3/UL (ref 0–0.4)
EOSINOPHIL NFR BLD AUTO: 1 %
ERYTHROCYTE [DISTWIDTH] IN BLOOD BY AUTOMATED COUNT: 13 % (ref 11.5–14.5)
EST. AVERAGE GLUCOSE BLD GHB EST-MCNC: 148 MG/DL
GLUCOSE SERPL-MCNC: 126 MG/DL (ref 74–99)
HBA1C MFR BLD: 6.8 %
HCT VFR BLD AUTO: 44.4 % (ref 36–46)
HGB BLD-MCNC: 14.4 G/DL (ref 12–16)
IMM GRANULOCYTES # BLD AUTO: 0.02 X10*3/UL (ref 0–0.5)
IMM GRANULOCYTES NFR BLD AUTO: 0.4 % (ref 0–0.9)
LYMPHOCYTES # BLD AUTO: 2.47 X10*3/UL (ref 0.8–3)
LYMPHOCYTES NFR BLD AUTO: 47.6 %
MCH RBC QN AUTO: 34 PG (ref 26–34)
MCHC RBC AUTO-ENTMCNC: 32.4 G/DL (ref 32–36)
MCV RBC AUTO: 105 FL (ref 80–100)
MONOCYTES # BLD AUTO: 0.41 X10*3/UL (ref 0.05–0.8)
MONOCYTES NFR BLD AUTO: 7.9 %
NEUTROPHILS # BLD AUTO: 2.19 X10*3/UL (ref 1.6–5.5)
NEUTROPHILS NFR BLD AUTO: 42.1 %
NRBC BLD-RTO: 0 /100 WBCS (ref 0–0)
PLATELET # BLD AUTO: 193 X10*3/UL (ref 150–450)
POTASSIUM SERPL-SCNC: 4.5 MMOL/L (ref 3.5–5.3)
RBC # BLD AUTO: 4.23 X10*6/UL (ref 4–5.2)
SODIUM SERPL-SCNC: 141 MMOL/L (ref 136–145)
WBC # BLD AUTO: 5.2 X10*3/UL (ref 4.4–11.3)

## 2024-11-01 ENCOUNTER — HOSPITAL ENCOUNTER (OUTPATIENT)
Dept: VASCULAR MEDICINE | Facility: HOSPITAL | Age: 74
Discharge: HOME | End: 2024-11-01
Payer: MEDICARE

## 2024-11-01 ENCOUNTER — OFFICE VISIT (OUTPATIENT)
Dept: VASCULAR SURGERY | Facility: HOSPITAL | Age: 74
End: 2024-11-01
Payer: MEDICARE

## 2024-11-01 VITALS
HEART RATE: 105 BPM | BODY MASS INDEX: 26.43 KG/M2 | DIASTOLIC BLOOD PRESSURE: 62 MMHG | HEIGHT: 61 IN | OXYGEN SATURATION: 93 % | SYSTOLIC BLOOD PRESSURE: 133 MMHG | WEIGHT: 140 LBS

## 2024-11-01 DIAGNOSIS — I65.23 BILATERAL CAROTID ARTERY STENOSIS WITHOUT CEREBRAL INFARCTION: ICD-10-CM

## 2024-11-01 DIAGNOSIS — I73.9 PAD (PERIPHERAL ARTERY DISEASE) (CMS-HCC): ICD-10-CM

## 2024-11-01 DIAGNOSIS — I77.1 SUBCLAVIAN ARTERY STENOSIS, LEFT: ICD-10-CM

## 2024-11-01 DIAGNOSIS — I65.23 BILATERAL CAROTID ARTERY STENOSIS WITHOUT CEREBRAL INFARCTION: Primary | ICD-10-CM

## 2024-11-01 PROCEDURE — 99406 BEHAV CHNG SMOKING 3-10 MIN: CPT | Performed by: NURSE PRACTITIONER

## 2024-11-01 PROCEDURE — 93880 EXTRACRANIAL BILAT STUDY: CPT

## 2024-11-01 PROCEDURE — 93923 UPR/LXTR ART STDY 3+ LVLS: CPT

## 2024-11-01 PROCEDURE — 99214 OFFICE O/P EST MOD 30 MIN: CPT | Performed by: NURSE PRACTITIONER

## 2024-11-04 ENCOUNTER — APPOINTMENT (OUTPATIENT)
Dept: VASCULAR MEDICINE | Facility: HOSPITAL | Age: 74
End: 2024-11-04
Payer: MEDICARE

## 2024-11-04 ENCOUNTER — APPOINTMENT (OUTPATIENT)
Dept: VASCULAR SURGERY | Facility: HOSPITAL | Age: 74
End: 2024-11-04
Payer: MEDICARE

## 2024-11-06 ENCOUNTER — APPOINTMENT (OUTPATIENT)
Dept: VASCULAR SURGERY | Facility: HOSPITAL | Age: 74
End: 2024-11-06
Payer: MEDICARE

## 2024-11-20 ENCOUNTER — APPOINTMENT (OUTPATIENT)
Dept: RADIOLOGY | Facility: HOSPITAL | Age: 74
End: 2024-11-20
Payer: MEDICARE

## 2024-11-29 ENCOUNTER — APPOINTMENT (OUTPATIENT)
Dept: VASCULAR SURGERY | Facility: HOSPITAL | Age: 74
End: 2024-11-29
Payer: MEDICARE

## 2024-12-06 ENCOUNTER — APPOINTMENT (OUTPATIENT)
Dept: VASCULAR SURGERY | Facility: HOSPITAL | Age: 74
End: 2024-12-06
Payer: MEDICARE

## 2024-12-12 ENCOUNTER — HOSPITAL ENCOUNTER (OUTPATIENT)
Dept: RADIOLOGY | Facility: HOSPITAL | Age: 74
Discharge: HOME | End: 2024-12-12
Payer: MEDICARE

## 2024-12-12 DIAGNOSIS — I65.23 BILATERAL CAROTID ARTERY STENOSIS WITHOUT CEREBRAL INFARCTION: ICD-10-CM

## 2024-12-12 DIAGNOSIS — I77.1 SUBCLAVIAN ARTERY STENOSIS, LEFT: ICD-10-CM

## 2024-12-12 LAB
CREAT SERPL-MCNC: 0.63 MG/DL (ref 0.6–1.3)
GFR SERPL CREATININE-BSD FRML MDRD: >90 ML/MIN/1.73M*2

## 2024-12-12 PROCEDURE — 82565 ASSAY OF CREATININE: CPT

## 2024-12-12 PROCEDURE — 70498 CT ANGIOGRAPHY NECK: CPT

## 2024-12-12 PROCEDURE — 2550000001 HC RX 255 CONTRASTS: Performed by: NURSE PRACTITIONER

## 2024-12-12 PROCEDURE — 70498 CT ANGIOGRAPHY NECK: CPT | Performed by: RADIOLOGY

## 2024-12-20 ENCOUNTER — OFFICE VISIT (OUTPATIENT)
Dept: VASCULAR SURGERY | Facility: HOSPITAL | Age: 74
End: 2024-12-20
Payer: MEDICARE

## 2024-12-20 VITALS
WEIGHT: 142 LBS | HEART RATE: 63 BPM | OXYGEN SATURATION: 98 % | BODY MASS INDEX: 26.81 KG/M2 | SYSTOLIC BLOOD PRESSURE: 87 MMHG | DIASTOLIC BLOOD PRESSURE: 61 MMHG | HEIGHT: 61 IN

## 2024-12-20 DIAGNOSIS — I65.23 BILATERAL CAROTID ARTERY STENOSIS WITHOUT CEREBRAL INFARCTION: ICD-10-CM

## 2024-12-20 DIAGNOSIS — I73.9 PAD (PERIPHERAL ARTERY DISEASE) (CMS-HCC): Primary | ICD-10-CM

## 2024-12-20 PROCEDURE — 3050F LDL-C >= 130 MG/DL: CPT | Performed by: NURSE PRACTITIONER

## 2024-12-20 PROCEDURE — 4010F ACE/ARB THERAPY RXD/TAKEN: CPT | Performed by: NURSE PRACTITIONER

## 2024-12-20 PROCEDURE — 3008F BODY MASS INDEX DOCD: CPT | Performed by: NURSE PRACTITIONER

## 2024-12-20 PROCEDURE — 1159F MED LIST DOCD IN RCRD: CPT | Performed by: NURSE PRACTITIONER

## 2024-12-20 PROCEDURE — 3078F DIAST BP <80 MM HG: CPT | Performed by: NURSE PRACTITIONER

## 2024-12-20 PROCEDURE — 3044F HG A1C LEVEL LT 7.0%: CPT | Performed by: NURSE PRACTITIONER

## 2024-12-20 PROCEDURE — 3074F SYST BP LT 130 MM HG: CPT | Performed by: NURSE PRACTITIONER

## 2024-12-20 PROCEDURE — 4004F PT TOBACCO SCREEN RCVD TLK: CPT | Performed by: NURSE PRACTITIONER

## 2024-12-20 PROCEDURE — 99213 OFFICE O/P EST LOW 20 MIN: CPT | Performed by: NURSE PRACTITIONER

## 2024-12-20 NOTE — PROGRESS NOTES
Vascular Surgery Clinic Note    CC: FUV carotid stenosis/PAD     History Of Present Illness:   Kalpana Young is a 74 y.o. female here for routine follow up. She underwent a right CFA/PFA/SFA endarterectomy and right fem-pop PALOMO in 2020 by Dr. Pena for CLTI. She had a right 2nd toe amputation at that time. She denies claudication symptoms, nocturnal rest pain or tissue loss.      She denies amaurosis fugax or TIA symptoms. CTA neck shows less than 50% ICA stenosis.      She has a significant blood pressure discrepancy between the arms, lower on the left. She denies left arm claudication or dizziness with use of the left arm.      She has DM on oral antidiabetic agents. She smoke 1/2 ppd. She remains on aspirin and a statin.     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)    Bruit    Cardiac murmur    Closed nondisplaced fracture of proximal phalanx of lesser toe of right foot    Diabetes mellitus (Multi)    Hyperlipidemia    Peripheral arterial disease (CMS-HCC)    Surgical wound, non healing    Fungal rash of trunk    Breast cancer screening by mammogram        SH:    Social Drivers of Health     Tobacco Use: High Risk (9/19/2024)    Patient History     Smoking Tobacco Use: Every Day     Smokeless Tobacco Use: Never     Passive Exposure: Not on file   Alcohol Use: Not on file   Financial Resource Strain: Not on file   Food Insecurity: Not on file   Transportation Needs: Not on file   Physical Activity: Not on file   Stress: Not on file   Social Connections: Not on file   Intimate Partner Violence: Not on file   Depression: Not on file   Housing Stability: Not on file   Utilities: Not on file   Digital Equity: Not on file   Health Literacy: Not on file        FH:  Family History   Problem Relation Name Age of  "Onset    Breast cancer Mother's Sister      Breast cancer Cousin          Allergies:   Allergies   Allergen Reactions    Codeine Rash    Penicillins Rash       ROS:  All systems were reviewed and noted to be negative, other than described above.     Objective:  Last Recorded Vitals  Blood pressure 87/61, pulse 63, height 1.549 m (5' 1\"), weight 64.4 kg (142 lb), SpO2 98%.    Meds:   Current Outpatient Medications   Medication Instructions    acetaminophen (Tylenol) 500 mg tablet As needed    aspirin 81 mg EC tablet 1 tablet, Daily    atorvastatin (Lipitor) 80 mg tablet 1 tablet, Daily    clopidogrel (Plavix) 75 mg tablet 1 tablet, Daily    glimepiride (AMARYL) 1 mg, Daily before breakfast    lisinopril 20 mg, Daily    metFORMIN (Glucophage) 1,000 mg tablet 1 tablet, Every 12 hours    metoprolol succinate XL (TOPROL-XL) 50 mg, Daily       Exam:  Constitutional: Well appearing, NAD   PSYCH: Appropriate mood and affect  Eyes: Sclera clear  Neck: Supple  CV: No tachycardia  RESP: Unlabored breathing  SKIN: No lesions  NEURO: No focal deficits noted  EXTREMITIES: Warm & well perfused  PULSES: nonpalpable left radial pulse. Palpable right radial pulse    Right arm BP: 102/64  Left arm BP: 80/40    Imaging Reviewed:  CT angio neck 12/12/2024    Narrative  Interpreted By:  Jody Bruno,  STUDY:  CT ANGIO NECK;  12/12/2024 8:27 am    INDICATION:  Signs/Symptoms:left carotid & subclavian stenosis.    ,I65.23 Occlusion and stenosis of bilateral carotid arteries,I77.1  Stricture of artery (CMS-[facility])    COMPARISON:  None.    ACCESSION NUMBER(S):  [id number]    ORDERING CLINICIAN:  JOSELYN MITCHELL    TECHNIQUE:  75 ML of Omnipaque 350 was administered intravenously and axial  images of the neck were acquired.  Coronal, sagittal, and 3-D  reconstructions were provided for review.    FINDINGS:    CTA NECK FINDINGS:    Aortic arch demonstrates mild atherosclerotic calcifications. There  are severe calcifications of the 3 vessel " branching origin. There is  severe stenosis at the origin of the left subclavian artery and mild  narrowing of the right brachiocephalic and left common carotid artery  origins. There is reconstitution of the distal left subclavian  however it appears smaller in caliber as compared to contralateral  side.    Mild atherosclerotic calcifications of the left greater than right  common carotid artery is seen.    The right carotid bifurcation demonstrates a calcified and  noncalcified plaque with no evidence of hemodynamically significant  stenosis. The distal right internal carotid artery in the neck is  normal.    There is a calcified and noncalcified plaque involving the left  carotid bifurcation and internal carotid artery origin with less than  50% stenosis as per the NASCET criteria. Distal right internal  carotid artery in the neck also demonstrates atherosclerotic  calcifications with mild narrowing.    Atherosclerotic calcifications are seen at the origin of bilateral  vertebral arteries with stenosis on the left. Otherwise bilateral  vertebral arteries in the neck are unremarkable.    Diffuse calcifications of bilateral intracranial internal carotid  arteries with mild atherosclerotic narrowing. Bilateral proximal  anterior and middle cerebral arteries are normal. There is a 1-2 mm  posterior projecting outpouching arising from the left intradural  vertebral artery which may reflect infundibulum versus aneurysm. Mild  atherosclerotic calcifications noted bilaterally without significant  stenosis. The right vertebral artery    Impression  Severe stenosis at the origin of the left subclavian artery with  reconstitution of distal vessel caliber. The distal left subclavian  artery appears somewhat smaller as compared to contralateral side.    Atherosclerotic disease involving bilateral carotid bifurcations and  internal carotid artery origins, left greater than right. There is  less than 50% stenosis as per NASCET  criteria on the left. No  significant stenosis on right.    A 1-2 mm posteriorly projecting outpouching arising from the left  intradural vertebral artery may reflect an infundibulum versus  aneurysm.    MACRO:  None    Signed by: Jody Bruno 12/13/2024 11:38 AM  Dictation workstation:   [id number]      Assessment & Plan:  1. PAD (peripheral artery disease) (CMS-[facility])  Vascular US Carotid Artery Duplex Bilateral    Vascular US ankle brachial index (JUSTIN) without exercise      2. Bilateral carotid artery stenosis without cerebral infarction  Vascular US Carotid Artery Duplex Bilateral    Vascular US ankle brachial index (JUSTIN) without exercise        Carotid artery stenosis, asymptomatic.   CTA neck shows mild carotid stenosis on the left.   Continue AP and statin   Smoking cessation   Carotid duplex in 1 year      PAD s/p R CFA/PFA/SFA endarterectomy, R fem-pop PALOMO (2020, [place]).   Asymptomatic.   Protective footwear, glucose control   JUSTIN in 1 year     Left subclavian artery stenosis, asymptomatic.   Blood pressure to be taken on right arm     Ready to quit: No  Counseling given: Yes  Tobacco comments: Cutting down from 2 packs per day to only a half pack currently    Alesia Diaz, APRN-CNP

## 2025-07-25 ENCOUNTER — HOSPITAL ENCOUNTER (OUTPATIENT)
Dept: RADIOLOGY | Facility: HOSPITAL | Age: 75
Discharge: HOME | End: 2025-07-25
Payer: MEDICARE

## 2025-07-25 DIAGNOSIS — J44.9 CHRONIC OBSTRUCTIVE PULMONARY DISEASE, UNSPECIFIED: ICD-10-CM

## 2025-07-25 DIAGNOSIS — K13.24: ICD-10-CM

## 2025-07-25 PROCEDURE — 71250 CT THORAX DX C-: CPT
